# Patient Record
Sex: FEMALE | Race: OTHER | HISPANIC OR LATINO | URBAN - METROPOLITAN AREA
[De-identification: names, ages, dates, MRNs, and addresses within clinical notes are randomized per-mention and may not be internally consistent; named-entity substitution may affect disease eponyms.]

---

## 2018-12-03 ENCOUNTER — INPATIENT (INPATIENT)
Facility: HOSPITAL | Age: 38
LOS: 0 days | Discharge: HOME | DRG: 812 | End: 2018-12-04
Attending: OBSTETRICS & GYNECOLOGY | Admitting: OBSTETRICS & GYNECOLOGY
Payer: MEDICAID

## 2018-12-03 VITALS — WEIGHT: 166.89 LBS | HEIGHT: 63 IN

## 2018-12-03 DIAGNOSIS — D62 ACUTE POSTHEMORRHAGIC ANEMIA: ICD-10-CM

## 2018-12-03 DIAGNOSIS — N93.9 ABNORMAL UTERINE AND VAGINAL BLEEDING, UNSPECIFIED: ICD-10-CM

## 2018-12-03 DIAGNOSIS — D64.9 ANEMIA, UNSPECIFIED: ICD-10-CM

## 2018-12-03 DIAGNOSIS — N83.201 UNSPECIFIED OVARIAN CYST, RIGHT SIDE: ICD-10-CM

## 2018-12-03 LAB
ABO RH CONFIRMATION: SIGNIFICANT CHANGE UP
ALBUMIN SERPL ELPH-MCNC: 3.5 G/DL — SIGNIFICANT CHANGE UP (ref 3.5–5)
ALP SERPL-CCNC: 123 U/L — HIGH (ref 40–120)
ALT FLD-CCNC: 21 U/L DA — SIGNIFICANT CHANGE UP (ref 10–60)
ANION GAP SERPL CALC-SCNC: 10 MMOL/L — SIGNIFICANT CHANGE UP (ref 5–17)
APPEARANCE UR: CLEAR — SIGNIFICANT CHANGE UP
APTT BLD: 26.4 SEC — LOW (ref 27.5–36.3)
AST SERPL-CCNC: 24 U/L — SIGNIFICANT CHANGE UP (ref 10–40)
BASOPHILS # BLD AUTO: 0.1 K/UL — SIGNIFICANT CHANGE UP (ref 0–0.2)
BASOPHILS NFR BLD AUTO: 0.8 % — SIGNIFICANT CHANGE UP (ref 0–2)
BILIRUB SERPL-MCNC: 0.2 MG/DL — SIGNIFICANT CHANGE UP (ref 0.2–1.2)
BILIRUB UR-MCNC: NEGATIVE — SIGNIFICANT CHANGE UP
BUN SERPL-MCNC: 14 MG/DL — SIGNIFICANT CHANGE UP (ref 7–18)
CALCIUM SERPL-MCNC: 7.9 MG/DL — LOW (ref 8.4–10.5)
CHLORIDE SERPL-SCNC: 105 MMOL/L — SIGNIFICANT CHANGE UP (ref 96–108)
CO2 SERPL-SCNC: 25 MMOL/L — SIGNIFICANT CHANGE UP (ref 22–31)
COLOR SPEC: YELLOW — SIGNIFICANT CHANGE UP
CREAT SERPL-MCNC: 0.82 MG/DL — SIGNIFICANT CHANGE UP (ref 0.5–1.3)
DIFF PNL FLD: ABNORMAL
EOSINOPHIL # BLD AUTO: 0.2 K/UL — SIGNIFICANT CHANGE UP (ref 0–0.5)
EOSINOPHIL NFR BLD AUTO: 2.2 % — SIGNIFICANT CHANGE UP (ref 0–6)
GLUCOSE SERPL-MCNC: 97 MG/DL — SIGNIFICANT CHANGE UP (ref 70–99)
GLUCOSE UR QL: NEGATIVE — SIGNIFICANT CHANGE UP
HCG SERPL-ACNC: <1 MIU/ML — SIGNIFICANT CHANGE UP
HCT VFR BLD CALC: 22.4 % — LOW (ref 34.5–45)
HGB BLD-MCNC: 6.4 G/DL — CRITICAL LOW (ref 11.5–15.5)
INR BLD: 1.01 RATIO — SIGNIFICANT CHANGE UP (ref 0.88–1.16)
KETONES UR-MCNC: NEGATIVE — SIGNIFICANT CHANGE UP
LEUKOCYTE ESTERASE UR-ACNC: ABNORMAL
LIDOCAIN IGE QN: 148 U/L — SIGNIFICANT CHANGE UP (ref 73–393)
LYMPHOCYTES # BLD AUTO: 2.4 K/UL — SIGNIFICANT CHANGE UP (ref 1–3.3)
LYMPHOCYTES # BLD AUTO: 23.6 % — SIGNIFICANT CHANGE UP (ref 13–44)
MCHC RBC-ENTMCNC: 18 PG — LOW (ref 27–34)
MCHC RBC-ENTMCNC: 28.5 GM/DL — LOW (ref 32–36)
MCV RBC AUTO: 63.2 FL — LOW (ref 80–100)
MONOCYTES # BLD AUTO: 0.4 K/UL — SIGNIFICANT CHANGE UP (ref 0–0.9)
MONOCYTES NFR BLD AUTO: 4.4 % — SIGNIFICANT CHANGE UP (ref 2–14)
NEUTROPHILS # BLD AUTO: 7 K/UL — SIGNIFICANT CHANGE UP (ref 1.8–7.4)
NEUTROPHILS NFR BLD AUTO: 68.9 % — SIGNIFICANT CHANGE UP (ref 43–77)
NITRITE UR-MCNC: NEGATIVE — SIGNIFICANT CHANGE UP
PH UR: 7 — SIGNIFICANT CHANGE UP (ref 5–8)
PLATELET # BLD AUTO: 480 K/UL — HIGH (ref 150–400)
POTASSIUM SERPL-MCNC: 3.2 MMOL/L — LOW (ref 3.5–5.3)
POTASSIUM SERPL-SCNC: 3.2 MMOL/L — LOW (ref 3.5–5.3)
PROT SERPL-MCNC: 8 G/DL — SIGNIFICANT CHANGE UP (ref 6–8.3)
PROT UR-MCNC: NEGATIVE — SIGNIFICANT CHANGE UP
PROTHROM AB SERPL-ACNC: 11.2 SEC — SIGNIFICANT CHANGE UP (ref 10–12.9)
RBC # BLD: 3.54 M/UL — LOW (ref 3.8–5.2)
RBC # FLD: 14.6 % — HIGH (ref 10.3–14.5)
SODIUM SERPL-SCNC: 140 MMOL/L — SIGNIFICANT CHANGE UP (ref 135–145)
SP GR SPEC: 1.01 — SIGNIFICANT CHANGE UP (ref 1.01–1.02)
UROBILINOGEN FLD QL: NEGATIVE — SIGNIFICANT CHANGE UP
WBC # BLD: 10.1 K/UL — SIGNIFICANT CHANGE UP (ref 3.8–10.5)
WBC # FLD AUTO: 10.1 K/UL — SIGNIFICANT CHANGE UP (ref 3.8–10.5)

## 2018-12-03 PROCEDURE — 76830 TRANSVAGINAL US NON-OB: CPT | Mod: 26

## 2018-12-03 PROCEDURE — 99285 EMERGENCY DEPT VISIT HI MDM: CPT

## 2018-12-03 PROCEDURE — 76856 US EXAM PELVIC COMPLETE: CPT | Mod: 26

## 2018-12-03 RX ORDER — IBUPROFEN 200 MG
600 TABLET ORAL EVERY 6 HOURS
Qty: 0 | Refills: 0 | Status: DISCONTINUED | OUTPATIENT
Start: 2018-12-03 | End: 2018-12-04

## 2018-12-03 RX ORDER — KETOROLAC TROMETHAMINE 30 MG/ML
15 SYRINGE (ML) INJECTION ONCE
Qty: 0 | Refills: 0 | Status: DISCONTINUED | OUTPATIENT
Start: 2018-12-03 | End: 2018-12-03

## 2018-12-03 RX ORDER — SODIUM CHLORIDE 9 MG/ML
1000 INJECTION, SOLUTION INTRAVENOUS
Qty: 0 | Refills: 0 | Status: DISCONTINUED | OUTPATIENT
Start: 2018-12-03 | End: 2018-12-04

## 2018-12-03 RX ORDER — ACETAMINOPHEN 500 MG
975 TABLET ORAL ONCE
Qty: 0 | Refills: 0 | Status: COMPLETED | OUTPATIENT
Start: 2018-12-03 | End: 2018-12-03

## 2018-12-03 RX ORDER — SODIUM CHLORIDE 9 MG/ML
1000 INJECTION INTRAMUSCULAR; INTRAVENOUS; SUBCUTANEOUS ONCE
Qty: 0 | Refills: 0 | Status: COMPLETED | OUTPATIENT
Start: 2018-12-03 | End: 2018-12-03

## 2018-12-03 RX ADMIN — SODIUM CHLORIDE 4000 MILLILITER(S): 9 INJECTION INTRAMUSCULAR; INTRAVENOUS; SUBCUTANEOUS at 21:20

## 2018-12-03 RX ADMIN — Medication 975 MILLIGRAM(S): at 21:41

## 2018-12-03 RX ADMIN — Medication 15 MILLIGRAM(S): at 21:42

## 2018-12-03 NOTE — ED PROVIDER NOTE - CARE PLAN
Principal Discharge DX:	Anemia  Secondary Diagnosis:	Cysts of both ovaries  Secondary Diagnosis:	Abnormal uterine bleeding

## 2018-12-03 NOTE — ED PROVIDER NOTE - ATTENDING CONTRIBUTION TO CARE
I ashlee'ed pt  on initial arrival and was available for general supervision throughout stay. note primarily mine

## 2018-12-03 NOTE — ED PROVIDER NOTE - MEDICAL DECISION MAKING DETAILS
39 y/o F pt w/ abd pain, vaginal bleeding, generalized weakness. Do suspect appendicitis due benign abd exam. Will do labs, pain control, US, check HCG. Reassess.

## 2018-12-03 NOTE — ED PROVIDER NOTE - ATTESTATION, MLM
No
I have reviewed and confirmed nurses' notes for patient's medications, allergies, medical history, and surgical history.

## 2018-12-03 NOTE — ED ADULT NURSE NOTE - ED STAT RN HANDOFF DETAILS
TRANSFERRED TO ED HOLDING , REPORTS GIVEN TO DAVID MCARTHUR, NO COMPLAINTS AT PRESENT , ALERT AND ORIENTED X3.

## 2018-12-03 NOTE — ED PROVIDER NOTE - PROGRESS NOTE DETAILS
Large bilateral ovarian cysts (L>>R) with normal flow. Hgc 6.4. Will admit for abnormal uterine bleeding, transfusion, ovarian cyst management. Patient understands. Discussed with RAJAN SORENSEN).

## 2018-12-03 NOTE — H&P ADULT - NSHPLABSRESULTS_GEN_ALL_CORE
FINDINGS:  The uterus measures 11.9 x 6.0 x 7.1 cm.    Fibroids noted measuring 4.5 x 3.5 x 4.3 cm and 1.0 x 0.9 x 1.4 cm.    The endometrium measures 10 mm.    The right ovary measures 4.9 x 3.5 x 4.6 cm. complex cystic lesion noted   measuring 3.0 x 3.6 x 3.7 cm.    The left ovary measures 8.9 x 6.0 x 6.7 cm. complex cystic lesion noted   measuring 7.4 x 5.7 x 6.2 cm.    Flow demonstrated in both ovaries.    There is no pelvic free fluid.    IMPRESSION:  Bilateral complex cystic lesions as described above. Short-term follow-up   pelvic ultrasound or MRI exam recommended for further evaluation.     Uterine fibroids as described above.                             6.4    10.1  )-----------( 480      ( 03 Dec 2018 20:37 )             22.4

## 2018-12-03 NOTE — H&P ADULT - NSHPPHYSICALEXAM_GEN_ALL_CORE
gen 38y.o f AAox3 NAD  cor clear s1 and s2  pulm ctab/l  abd bs+ soft nontender  pelvic speculum no active bleeding  enlarged uterus 11cm, no adnexal tenderness, no palpable masses  ext neg tenderness   < from: US Transvaginal (12.03.18 @ 20:13) >

## 2018-12-03 NOTE — H&P ADULT - ASSESSMENT
a/p symptomatic anemia and bilateral ovarian cysts  Admit to gyn   2 units packed red cells  regular diet  cysts to be followed closely as outpatient  [place]

## 2018-12-03 NOTE — H&P ADULT - HISTORY OF PRESENT ILLNESS
· HPI Objective Statement: 39 y/o [id number] followed at [place] [facility] for endometriosis, lmp 11/2 bleeding intermittently for 30 days with clots.  pt aslo  c/o abd pain, vaginal bleeding, generalized weakness, fatigue x 1 month. Sx started after a biopsy was done by  in August for endometriosis which was benign. Pt also had fallopian tubes remove b/c she had fallopian tube cysts. Today hardly used any pads but yesterday went through 6 pads. Denies CP, SOB, ROJO, and any other complaints. NKDA.	  ob pnc dr. martin morataya  gyn hx denies stds, onset menses 13/reg/3 days, heavy recently, chronic pelvic pain, hx ovarian cysts  endometriosis biopsy benign, last pap 2018  obhx c/s   social no s/a/d  surghx bilateral fallopian tubes removed  social no s/a/d

## 2018-12-03 NOTE — ED PROVIDER NOTE - OBJECTIVE STATEMENT
37 y/o F pt c/o abd pain, vaginal bleeding, generalized weakness, fatigue x 1 month. Sx started after a biopsy was done by  in August for endometriosis. Pt also had fallopian tubes remove b/c she had fallopian tube cysts. Today hardly used any pads but yesterday went through 6 pads. Denies CP, SOB, ROJO, and any other complaints. NKDA.

## 2018-12-04 VITALS
OXYGEN SATURATION: 100 % | TEMPERATURE: 99 F | SYSTOLIC BLOOD PRESSURE: 147 MMHG | DIASTOLIC BLOOD PRESSURE: 83 MMHG | HEART RATE: 70 BPM | RESPIRATION RATE: 17 BRPM

## 2018-12-04 LAB
HCT VFR BLD CALC: 27.8 % — LOW (ref 34.5–45)
HGB BLD-MCNC: 8.2 G/DL — LOW (ref 11.5–15.5)
MCHC RBC-ENTMCNC: 20.6 PG — LOW (ref 27–34)
MCHC RBC-ENTMCNC: 29.6 GM/DL — LOW (ref 32–36)
MCV RBC AUTO: 69.8 FL — LOW (ref 80–100)
PLATELET # BLD AUTO: 383 K/UL — SIGNIFICANT CHANGE UP (ref 150–400)
RBC # BLD: 3.98 M/UL — SIGNIFICANT CHANGE UP (ref 3.8–5.2)
RBC # FLD: 21.2 % — HIGH (ref 10.3–14.5)
WBC # BLD: 5.2 K/UL — SIGNIFICANT CHANGE UP (ref 3.8–10.5)
WBC # FLD AUTO: 5.2 K/UL — SIGNIFICANT CHANGE UP (ref 3.8–10.5)

## 2018-12-04 RX ORDER — NORETHINDRONE 0.35 MG/1
1 TABLET ORAL
Qty: 20 | Refills: 0 | OUTPATIENT
Start: 2018-12-04 | End: 2018-12-23

## 2018-12-04 RX ORDER — DOCUSATE SODIUM 100 MG
1 CAPSULE ORAL
Qty: 60 | Refills: 0 | OUTPATIENT
Start: 2018-12-04 | End: 2019-01-02

## 2018-12-04 RX ORDER — FERROUS SULFATE 325(65) MG
1 TABLET ORAL
Qty: 90 | Refills: 3 | OUTPATIENT
Start: 2018-12-04 | End: 2019-04-02

## 2018-12-04 RX ORDER — INFLUENZA VIRUS VACCINE 15; 15; 15; 15 UG/.5ML; UG/.5ML; UG/.5ML; UG/.5ML
0.5 SUSPENSION INTRAMUSCULAR ONCE
Qty: 0 | Refills: 0 | Status: DISCONTINUED | OUTPATIENT
Start: 2018-12-04 | End: 2018-12-04

## 2018-12-04 NOTE — DISCHARGE NOTE ADULT - MEDICATION SUMMARY - MEDICATIONS TO TAKE
I will START or STAY ON the medications listed below when I get home from the hospital:    ferrous sulfate 325 mg (65 mg elemental iron) oral tablet  -- 1 tab(s) by mouth 3 times a day   -- Check with your doctor before becoming pregnant.  Do not chew, break, or crush.  May discolor urine or feces.    -- Indication: For supplementation    Colace 100 mg oral capsule  -- 1 cap(s) by mouth 2 times a day  -- Medication should be taken with plenty of water.    -- Indication: For Constipation    Aygestin 5 mg oral tablet  -- 1 tab(s) by mouth once a day   -- Do not take this drug if you are pregnant.  It is very important that you take or use this exactly as directed.  Do not skip doses or discontinue unless directed by your doctor.    -- Indication: For Abnormal uterine bleeding

## 2018-12-04 NOTE — DISCHARGE NOTE ADULT - HOSPITAL COURSE
Pt admitted for severe anemia due to fibroids, no longer bleeding, Pt given 2 units PRBC with appropriate rise in H/H. Pt started on aygestin on discharge, stable for discharge home

## 2018-12-04 NOTE — PROGRESS NOTE ADULT - SUBJECTIVE AND OBJECTIVE BOX
Patient seen at [facility] resting comfortably offers no new complaints. Pt states she is not having vaginal bleeding at this time. + Ambulation, voiding without difficulty, + flatus; tolerating regular diet. Denies HA, CP, SOB, N/V/D,  no bm; dizziness, palpitations, worsening abdominal pain, worsening vaginal bleeding, or any other concerns.     Vital Signs Last 24 Hrs  T(C): 36.9 (04 Dec 2018 06:40), Max: 37.1 (04 Dec 2018 00:25)  T(F): 98.4 (04 Dec 2018 06:40), Max: 98.8 (04 Dec 2018 00:25)  HR: 64 (04 Dec 2018 06:40) (59 - 99)  BP: 118/66 (04 Dec 2018 06:40) (100/65 - 161/92)  BP(mean): --  RR: 16 (04 Dec 2018 06:40) (16 - 18)  SpO2: 100% (04 Dec 2018 06:40) (98% - 100%)    Gen: A&O x 3, NAD  Chest: CTA B/L  Cardiac: S1,S2  RRR  Breast: Soft, nontender, nonengorged  Abdomen: +BS; soft; Nontender, nondistended  Gyn: no vaginal bleeding   Extremities: Nontender, no worsening edema                          6.4    10.1  )-----------( 480      ( 03 Dec 2018 20:37 )             22.4     12-03    140  |  105  |  14  ----------------------------<  97  3.2<L>   |  25  |  0.82    Ca    7.9<L>      03 Dec 2018 20:37    TPro  8.0  /  Alb  3.5  /  TBili  0.2  /  DBili  x   /  AST  24  /  ALT  21  /  AlkPhos  123<H>  12-03      A/P: 38 year old with severe anemia, fibroid uterus   -s/p 2 units PRBC   -f/u post transfusion CBC   -oob, encourage ambulation  -likely discharge home later today  -d/w Dr. Fontanez

## 2018-12-04 NOTE — PROGRESS NOTE ADULT - PROBLEM SELECTOR PLAN 1
-s/p 2 units PRBC   -f/u post transfusion CBC   -oob, encourage ambulation  -likely discharge home later today  -d/w Dr. Fontanez

## 2018-12-04 NOTE — DISCHARGE NOTE ADULT - PROVIDER TOKENS
FREE:[LAST:[[facility]],PHONE:[(   )    -],FAX:[(   )    -]],FREE:[LAST:[[facility]],PHONE:[(   )    -],FAX:[(   )    -]]

## 2018-12-04 NOTE — DISCHARGE NOTE ADULT - BECAUSE OF A PHYSICAL, MENTAL OR EMOTIONAL CONDITION, DO YOU HAVE DIFFICULTY DOING  ERRANDS ALONE LIKE VISITING A DOCTOR'S OFFICE OR SHOPPING (15 YEARS AND OLDER)
----- Message from Jennifer Pina sent at 8/21/2017  4:21 PM CDT -----  Contact: pt's daughter  PLEASE CALL IN       DIDN'T TRANSMIT     SAYS   OTC     [facility]    [address]   [place]  Sodium Bicarb -       SEE BELOW   OK'ED 8/7   sodium bicarbonate 650 MG tablet   Medication   Date: 8/7/2017 Department: [facility] Ordering/Authorizing: Jeannie Rothman MD  Order Providers     Prescribing Provider Encounter Provider  MD Jeannie Gonzalez MD  Medication Detail      Disp Refills Start End   sodium bicarbonate 650 MG tablet 120 tablet 11 8/7/2017 8/7/2018   Sig - Route: Take 2 tablets (1,300 mg total) by mouth 3 (three) times daily. - Oral   Class: OTC       shelly already spoke to pt    No

## 2018-12-04 NOTE — DISCHARGE NOTE ADULT - CARE PROVIDER_API CALL
[facility],   Phone: (   )    -  Fax: (   )    -    [facility],   Phone: (   )    -  Fax: (   )    -

## 2018-12-04 NOTE — DISCHARGE NOTE ADULT - PATIENT PORTAL LINK FT
You can access the [facility] Patient Portal, offered by [facility], by registering with the following website: [email or web address]

## 2018-12-04 NOTE — DISCHARGE NOTE ADULT - CARE PLAN
Principal Discharge DX:	Abnormal uterine bleeding  Goal:	Take Aygestin 5mg daily until follow up with GYN  Assessment and plan of treatment:	Nothing in vagina, no sex no tampons.  No heavy lifting,  no pushing,  ambulation daily as tolerated.  see your gynecologist within 1 week, as soon as possible  Secondary Diagnosis:	Anemia  Goal:	take iron three times daily  Assessment and plan of treatment:	take iron, folic acid, vitamin C, and prenatal vitamins. eat iron fortified food. Please take iron tablets three times daily. Return if any dizziness, shortness of breath, palpitations, chest pain or any other acute symptoms.

## 2018-12-04 NOTE — DISCHARGE NOTE ADULT - NS AS ACTIVITY OBS
Driving allowed/Walking-Outdoors allowed/Showering allowed/Stairs allowed/No Heavy lifting/straining

## 2018-12-04 NOTE — DISCHARGE NOTE ADULT - CONDITIONS AT DISCHARGE
Pt AOx3 breathing unlabored no c/o resp. distress chest pain or SOB. Pt with Dx. of Anemia; B/L ovaries cyst abnormal uterine, pt post 2 unit of PRBC yesterday. Abdomen soft non tender non distended BS present in all 4 quadrants, pt tolerating diet denies any N/V. Cap refill less than 3 seconds pulses present in all extremities Pt with positive sensation and mobility OOB ambulating voiding without any difficulties Vital signs stable no distress noted, Pt remains with minimal vaginal bleeding. Pt for DC home verbalizes understanding and agreement with DC. All needs of pt met thus far.

## 2018-12-04 NOTE — DISCHARGE NOTE ADULT - PLAN OF CARE
Take Aygestin 5mg daily until follow up with GYN Nothing in vagina, no sex no tampons.  No heavy lifting,  no pushing,  ambulation daily as tolerated.  see your gynecologist within 1 week, as soon as possible take iron three times daily take iron, folic acid, vitamin C, and prenatal vitamins. eat iron fortified food. Please take iron tablets three times daily. Return if any dizziness, shortness of breath, palpitations, chest pain or any other acute symptoms.

## 2018-12-26 PROCEDURE — 76856 US EXAM PELVIC COMPLETE: CPT

## 2018-12-26 PROCEDURE — 85027 COMPLETE CBC AUTOMATED: CPT

## 2018-12-26 PROCEDURE — 83690 ASSAY OF LIPASE: CPT

## 2018-12-26 PROCEDURE — 36415 COLL VENOUS BLD VENIPUNCTURE: CPT

## 2018-12-26 PROCEDURE — 80053 COMPREHEN METABOLIC PANEL: CPT

## 2018-12-26 PROCEDURE — 36430 TRANSFUSION BLD/BLD COMPNT: CPT

## 2018-12-26 PROCEDURE — 81001 URINALYSIS AUTO W/SCOPE: CPT

## 2018-12-26 PROCEDURE — 86901 BLOOD TYPING SEROLOGIC RH(D): CPT

## 2018-12-26 PROCEDURE — 86900 BLOOD TYPING SEROLOGIC ABO: CPT

## 2018-12-26 PROCEDURE — 86850 RBC ANTIBODY SCREEN: CPT

## 2018-12-26 PROCEDURE — 99285 EMERGENCY DEPT VISIT HI MDM: CPT | Mod: 25

## 2018-12-26 PROCEDURE — 85730 THROMBOPLASTIN TIME PARTIAL: CPT

## 2018-12-26 PROCEDURE — 85610 PROTHROMBIN TIME: CPT

## 2018-12-26 PROCEDURE — 76830 TRANSVAGINAL US NON-OB: CPT

## 2018-12-26 PROCEDURE — P9040: CPT

## 2018-12-26 PROCEDURE — 86923 COMPATIBILITY TEST ELECTRIC: CPT

## 2018-12-26 PROCEDURE — 84702 CHORIONIC GONADOTROPIN TEST: CPT
